# Patient Record
Sex: FEMALE | Race: WHITE
[De-identification: names, ages, dates, MRNs, and addresses within clinical notes are randomized per-mention and may not be internally consistent; named-entity substitution may affect disease eponyms.]

---

## 2021-06-14 ENCOUNTER — HOSPITAL ENCOUNTER (OUTPATIENT)
Dept: HOSPITAL 84 - D.HCCECHO | Age: 76
Discharge: HOME | End: 2021-06-14
Attending: INTERNAL MEDICINE
Payer: MEDICARE

## 2021-06-14 DIAGNOSIS — R06.00: Primary | ICD-10-CM

## 2021-06-15 NOTE — ST
PATIENT:SERGIO HANEY                             MEDICAL RECORD: [id number]
SEX: F                                                   LOCATION:[facility]         
ORDER #:                                                 ADMISSION DATE: 06/14/21
AGE OF PATIENT: 76            
 
REFERRING PHYSICIAN:                                                             
 
INTERPRETING PHYSICIAN: KAYLA CUI MD          

 
 
DATE OF SERVICE:  06/14/2021
 
NUCLEAR STRESS TEST
 
FINDINGS:
1. Gated is normal.  Normal wall motion, normal EF, calculated EF 77%.
2. SPECT imaging in short axis view shows good uptake along the anterior wall,
lateral wall, and inferior wall.
3. Horizontal axis:  Horizontal axis confirms good uptake along the anterior
wall and inferior wall.
4. Vertical axis:  Vertical axis shows good uptake along the lateral wall and
septum.
 
FINAL IMPRESSION:
1. Normal gated, normal wall motion, normal EF 77%.
2. Normal SPECT imaging.
 
FINAL IMPRESSION:  The scan is felt low risk for any significant myocardial
ischemia or previous myocardial infarction.  LV function remains normal. 
Continued medical management and risk factor modification is recommended.
 
TRANSINT:[id number] Voice Confirmation ID: [id number] DOCUMENT ID: [id number]
 
 
                                           
                                           KAYLA CUI MD          
 
 
 
Electronically Signed by KAYLA CUI on 06/15/21 at 1432
 
 
 
 
 
 
 
 
 
CC:                                                             [id number]
DICTATION DATE: 06/14/21 1626     :     06/15/21 0513      DEP CLI 
                                                                      06/14/21
[id number] [place] [address]